# Patient Record
Sex: MALE
[De-identification: names, ages, dates, MRNs, and addresses within clinical notes are randomized per-mention and may not be internally consistent; named-entity substitution may affect disease eponyms.]

---

## 2022-09-26 ENCOUNTER — APPOINTMENT (OUTPATIENT)
Dept: UROLOGY | Facility: CLINIC | Age: 42
End: 2022-09-26

## 2022-09-26 VITALS
DIASTOLIC BLOOD PRESSURE: 78 MMHG | HEIGHT: 70.87 IN | WEIGHT: 165.35 LBS | TEMPERATURE: 97.2 F | BODY MASS INDEX: 23.15 KG/M2 | SYSTOLIC BLOOD PRESSURE: 128 MMHG

## 2022-09-26 DIAGNOSIS — Z78.9 OTHER SPECIFIED HEALTH STATUS: ICD-10-CM

## 2022-09-26 PROCEDURE — 99205 OFFICE O/P NEW HI 60 MIN: CPT | Mod: 25

## 2022-09-26 PROCEDURE — 93980 PENILE VASCULAR STUDY: CPT

## 2022-09-26 PROCEDURE — 54235 NJX CORPORA CAVERNOSA RX AGT: CPT

## 2022-09-26 NOTE — HISTORY OF PRESENT ILLNESS
[FreeTextEntry1] : This is a 42-year-old gentleman with a 7-year history of erectile dysfunction.  The patient has seen multiple physicians in [place].  The erectile dysfunction is causing him a lot of distress and marital problems.  His erection is insufficient to achieve intercourse with his wife.  He has tried oral medications penile injections and even went as far as to get platelet rich plasma injections in the penis.  He has had multiple duplex studies which were inconclusive.

## 2022-09-26 NOTE — ASSESSMENT
[FreeTextEntry1] : The patient scheduled this consultation to discuss the different treatment options available for his organic erectile dysfunction. The following note describes the conversation that was performed today during the consultation. \par \par  I reviewed the Patient History Form which the patient filled out, made sure that his ailment was organic erectile dysfunction and I discussed in detail with him all previously tried treatments for his ED. We had a thorough discussion about all of the alternatives available, and I made sure to include in our discussion pills such as Levitra/Viagra/Cialis, as well as penile self injectable therapies, MUSE (Medicated Urethral System for Erection), vacuum device, and penile implant.  I stressed the risks and benefits and pros and cons of each of these options extensively. A power point presentation was also used to illustrate each treatment option. The patient was also provided with a packet of written information as well as a list of patients to speak to on the phone.  \par \par In discussing penile implant surgery, the patient was made aware of the different types of penile implants- including semirigid devices, 2-piece or Ambicor (AMS) devices, and the inflatable penile prosthesis with 3 components. I went on to mention that there are 2 brands of devices, Coloplast and AMS, and that the AMS is impregnated with antibiotic (inhibizone), and the Coloplast is dipped then coated with an antibiotic. I also referred him to my website in order to obtain additional information about the types of implants available.  He felt he would defer to my judgment as to which device to use.\par \par I also described the highlights and benefits of the "No-Touch" surgical technique and outcome data including number of procedures previously performed and updated rate of infection. In this initial discussion of the penile implant option, I made sure we had a very long and broderick discussion about the risks.  I stated that, first and foremost, infection is the most dreaded risk and complication, which range in incidence from 1-3% of all cases performed in the [place], but that in my hands, using the "No-Touch" technique the incidence of infection is less than 1%.  I stated that should infection occur, the entire device would need to be removed, which typically happens in the first several weeks after surgery.  I explained that should this occur, there would likely be corporal fibrosis, scarring, penile shortening and even penile necrosis and disfigurement.  I said that while I would do absolutely everything possible to reduce and mitigate this risk, if it occur, the device would have to be removed, and then a salvage procedure with a semi-rigid implant possibly done, or the device would have to be removed with delayed re-implantation, or simply avoid future surgery completely.  I explained what this salvage procedure is, and that a new implant could be placed in the same setting with a complex irrigation of antibiotics and saline lavage, but that the infection risk at this salvage procedure is even higher, up to 30%. Furthermore the possible need for hospitalization, prolonged intravenous antibiotics and need further additional surgery was also discussed.  The patient was informed that if the salvage operation failed or if the infected implant were to be removed completely that significant shortening of the penis would occur making implantation of another device very difficult with very poor outcome and patient satisfaction. I explained that this is a real and significant risk that has to be weighed and considered.\par \par Next I expounded on the other risks of the operation.  These include injury to the urethra or bladder, and should these occur, the operation would have to be altered or aborted.  I explained that very rarely, vascular injury and bleeding can happen, and if iliac vein injury occurs from reservoir placement, this could be catastrophic and result in major blood loss and theoretically risk of leg loss in severe instances.  \par \par I went on to discuss that after the implant is placed, penile shortening could likely occur, and this is up to 1-2cm total.  Some of this is due to lack of glans engorgement, though MUSE could be used post-operatively to reduce this factor.  Next I also explained the risk of dissatisfaction with the cosmetic or functional result of the device, meaning that he could simply be unhappy with the result.  Some people find that while they have a good full erection, they have changes in sensation, difficulty obtaining an orgasm, and dissatisfaction with sex in general.  I made sure he verbalized and demonstrated a good understanding of these points.\par \par Next, I explained the risk of device breakage or failure, and future operations might be needed should this occur to fix the device tubing breakages with fluid leaks.  There is also a risk of auto-inflation, and even inability to successfully use the device due to technical considerations and inability to use or find the pump.  I did state that I would be available to him to teach him and train him to use his device, and also available to treat any other issue mentioned above such as device breakage or auto-inflation.\par \par Next we discussed the fact that rarely further minor surgery may be needed to make final adjustments to the penile implant. Reasons for this would be to adjust the length of the cylinders, reposition the pump or location of the reservoir. \par \par Prior to scheduling surgery the patient was asked to read the material which is provided to him today, to see a cardiologist to obtain a medical clearance, to visit our website [email or web address]   to obtain additional information, to call patients who were previously implanted and to discuss his options with his partner. Before leaving the consultation, I made sure he verbalized understanding all the risk and benefits, and pros and cons of surgery.  He had the ability to ask questions, and I also explained to him what to expect from the surgery.  I made sure he had access to literature to read, and offered him the ability to speak to prior patients of mine to get a sense of what to expect, and that these reports would hopefully be as unbiased as possible. If he remains interested in having an implant he understands that he will need to schedule a penile Duplex ultrasound study during which measurements of the penis will be made.\par \par \par PENILE INJECTION TEST:\par \par TC RAMIREZ \par 09/26/2022 \par \par The patient was placed supine on the procedure table.  The left side of the penis was prepped with alcohol, and the patient received 40 mcg @ 1 cc of Alprostadil.\par The patient tolerated the injection well.  No bleeding occurred at the injection site.   \par \par The patient was examined at 5, 10, 30 and 45 minutes interval post injection:  \par The patient’s penis was:   13   cm stretched \par Deformity: up\par Narrowing: \par An “hour glass deformity”: yes\par Curvature: upward\par a 3.4 by 4.37 mm scar is preset in the corpora\par Post Injection Erectile Response:\par At 5 minutes:   20   % rigid erection was noted. \par At 15 minutes: 50  % rigidity.  \par At 30 minutes:   60  % rigidity. \par Spontaneous detumescence occurred after 60 minutes.   \par The patient was discharged with a soft erection and was advised to call should his erection become more rigid.\par \par Post response evaluation by the patient:\par The patient described that this erection was better than his sexually induced erections.  \par The erection was not adequate for vaginal penetration.\par Impression:         Severe organic erectile dysfunction.\par \par Recommendation:   Insertion of inflatable implant\par \par   \par PENILE DUPLEX SONOGRAPHY WITH PULSED DOPPLER ANALYSIS:\par Procedure description:\par The flaccid penis was scanned with the 18 MHz probe and images obtained longitudinally.  \par The diameters of the corporal arteries were measured:\par \par The right cavernosal artery measured: .95 mm\par The left cavernosal artery measured:  1.08  mm\par \par Following intracavernous injection of alprostadil 40 microgram/ml in 1  ml\par The penis was rescanned and the diameter of the cavernosal arteries were measured again to assess distensibility in response to the vasoactive medication. \par (A 100% increase in diameter is normally expected.)\par \par The left cavernosal artery measured: 1.16  mm\par The right cavernosal artery measured: ,9  mm\par \par Pulsed Doppler analysis:\par Each of the selective imaged arteries underwent penile Doppler analysis with generation of waveform. \par The peak velocity data of the cavernosal arteries is tabulated below: \par (A velocity of 35 cm/s or more is normally expected.)  \par Resistive index parameters were obtained bilaterally (normal is 100%): \par \par Results: \par Left cavernosal artery peak systolic velocity at 15 minutes: 33.6  centimeters per second\par Let cavernosal end-diastolic velocity at 15 minutes:           12.9     centimeters per second\par Left cavernosal artery resistive index:    62   % \par \par Right cavernosal artery peak systolic velocity at 15 minutes:  36.5 centimeters per second \par Right end-diastolic velocity at 15 minutes:                    7.75             centimeters per second\par Right cavernosal artery resistive index:    .79  % \par \par Spontaneous detumescence occurred after 60  minutes\par The patient was discharged with a soft erection and was advised to call should an erection persist for more than 4 hours.  \par \par Impression:\par Arterial  distensibility:  nl \par Arterial peak flow velocities:  nl   \par Resistive index:  abnormal\par \par Based of the patient's medical history, pertinent physical findings and the above parameters, the patient's diagnosis is venoocclusive dysfunction\par \par After the Duplex study was terminated, I sat with the patient and his brother for 45 minutes and I explained to him the findings of the study. I also discussed his diagnosis with him as well as the recommended course of action. He understands the reason why he has ED and agrees with the plan of action.\par \par \par \par \par \par

## 2022-09-26 NOTE — PHYSICAL EXAM
[Edema] : no peripheral edema [Abdomen Soft] : soft [Abdomen Tenderness] : non-tender [Costovertebral Angle Tenderness] : no ~M costovertebral angle tenderness [Urethral Meatus] : meatus normal [Urinary Bladder Findings] : the bladder was normal on palpation [Scrotum] : the scrotum was normal [Testes Mass (___cm)] : there were no testicular masses [No Prostate Nodules] : no prostate nodules

## 2022-09-26 NOTE — REVIEW OF SYSTEMS
[Negative] : Heme/Lymph [FreeTextEntry1] : Tremors, lower  back pain with prolonged standing, excessive thirst at night

## 2022-10-04 ENCOUNTER — TRANSCRIPTION ENCOUNTER (OUTPATIENT)
Age: 42
End: 2022-10-04

## 2022-11-21 RX ORDER — TADALAFIL 5 MG/1
5 TABLET ORAL
Qty: 30 | Refills: 0 | Status: ACTIVE | COMMUNITY
Start: 1900-01-01 | End: 1900-01-01

## 2023-01-23 ENCOUNTER — APPOINTMENT (OUTPATIENT)
Dept: UROLOGY | Facility: CLINIC | Age: 43
End: 2023-01-23
Payer: COMMERCIAL

## 2023-01-23 VITALS
BODY MASS INDEX: 24.34 KG/M2 | TEMPERATURE: 98.4 F | SYSTOLIC BLOOD PRESSURE: 118 MMHG | WEIGHT: 170 LBS | DIASTOLIC BLOOD PRESSURE: 72 MMHG | HEIGHT: 70 IN

## 2023-01-23 DIAGNOSIS — N52.9 MALE ERECTILE DYSFUNCTION, UNSPECIFIED: ICD-10-CM

## 2023-01-23 DIAGNOSIS — Z01.818 ENCOUNTER FOR OTHER PREPROCEDURAL EXAMINATION: ICD-10-CM

## 2023-01-23 DIAGNOSIS — R35.0 FREQUENCY OF MICTURITION: ICD-10-CM

## 2023-01-23 PROCEDURE — 51798 US URINE CAPACITY MEASURE: CPT | Mod: 59

## 2023-01-23 PROCEDURE — 51741 ELECTRO-UROFLOWMETRY FIRST: CPT

## 2023-01-23 PROCEDURE — 99215 OFFICE O/P EST HI 40 MIN: CPT | Mod: 25

## 2023-01-23 RX ORDER — IBUPROFEN 600 MG/1
600 TABLET, FILM COATED ORAL 3 TIMES DAILY
Qty: 28 | Refills: 0 | Status: ACTIVE | COMMUNITY
Start: 2023-01-23 | End: 1900-01-01

## 2023-01-23 RX ORDER — CHLORHEXIDINE GLUCONATE 213 G/1000ML
4 SOLUTION TOPICAL
Qty: 1 | Refills: 0 | Status: ACTIVE | COMMUNITY
Start: 2023-01-23 | End: 1900-01-01

## 2023-01-23 RX ORDER — DOCUSATE SODIUM 100 MG/1
100 CAPSULE ORAL TWICE DAILY
Qty: 56 | Refills: 0 | Status: ACTIVE | COMMUNITY
Start: 2023-01-23 | End: 1900-01-01

## 2023-01-23 RX ORDER — MAGNESIUM HYDROXIDE 400 MG/5ML
7.75 SUSPENSION, ORAL (FINAL DOSE FORM) ORAL
Qty: 355 | Refills: 0 | Status: ACTIVE | COMMUNITY
Start: 2023-01-23 | End: 1900-01-01

## 2023-01-23 RX ORDER — CEPHALEXIN 250 MG/1
250 CAPSULE ORAL
Qty: 84 | Refills: 0 | Status: ACTIVE | COMMUNITY
Start: 2023-01-23 | End: 1900-01-01

## 2023-01-23 RX ORDER — ACETAMINOPHEN AND CODEINE 300; 30 MG/1; MG/1
300-30 TABLET ORAL EVERY 4 HOURS
Qty: 40 | Refills: 0 | Status: ACTIVE | COMMUNITY
Start: 2023-01-23 | End: 1900-01-01

## 2023-01-23 RX ORDER — OXYCODONE AND ACETAMINOPHEN 5; 325 MG/1; MG/1
5-325 TABLET ORAL EVERY 4 HOURS
Qty: 42 | Refills: 0 | Status: ACTIVE | COMMUNITY
Start: 2023-01-23 | End: 1900-01-01

## 2023-02-03 ENCOUNTER — APPOINTMENT (OUTPATIENT)
Dept: UROLOGY | Facility: AMBULATORY SURGERY CENTER | Age: 43
End: 2023-02-03

## 2023-04-25 PROBLEM — Z01.818 PREOPERATIVE EXAMINATION: Status: ACTIVE | Noted: 2023-01-20

## 2023-04-25 PROBLEM — N52.9 ERECTILE DYSFUNCTION OF ORGANIC ORIGIN: Status: ACTIVE | Noted: 2022-09-26

## 2023-04-25 PROBLEM — N52.9 ERECTILE DYSFUNCTION: Status: ACTIVE | Noted: 2022-09-26

## 2023-04-25 PROBLEM — R35.0 FREQUENCY OF URINATION: Status: ACTIVE | Noted: 2023-04-25

## 2023-04-25 NOTE — ASSESSMENT
[FreeTextEntry1] : In this pre-operative setting, I spent an additional 65 minutes to the procedures performed today, ensuring that the discussions we had in the office during the patient's initial consultation and penile Doppler visit was still clear in his mind, that he understood the risks and benefits and pros and cons of the penile implant procedure, including treatment alternatives, and that he verbalized the risks and wanted to proceed.  I again made sure he knew that the penile implant is a prosthesis that contains three basic elements consisting of: two parallel hollow cylinders that are placed within the penis, a pump and valve mechanism that is placed in the scrotum, and a spherical reservoir filled with saline that is placed in the lower abdomen.\par Although the penile prosthesis is placed through an incision in the scrotum, I explained that this is a surgical procedure that is relatively simple but can be more complicated in the presence of scar tissue in the pelvic area due to previous surgery and which will sometimes require an additional or alternative incision for placement of the reservoir, or potentially a narrow-based device if extensive fibrosis of the penis noted.  \par We reiterated that the penile implant is designed to simulate but not necessarily replace the natural erectile capability that he previously had.  Once implanted, there will be limited or no capability of natural erections occurring in the future and will limit the opportunity for other treatment options such as pills or injections, to successfully work in his body. When inflated, I explained and he understood that the implant will expand in girth, but not length and when deflated, the penis will become flaccid but potentially remain extended and will not retract due to the cylinders that are placed in the penile shaft.  The implant reservoir has a lock-out valve to limit the opportunity for fluid to flow into the cylinders and create an unexpected or unwanted erection, called "Auto Inflation".  Due to the lock-out valve, this rarely occurs, however if the reservoir is allowed to heal with the penile cylinders maintained partially inflated, he recognized that a partial erection will always be present and it is possible for auto-inflation to occur.\par We talked about the fact that the penile implant will not grow in length beyond the basic, "stretched penis" level and the measurement, which were obtained during the penile Duplex study.  This is the length he considers to be sufficient for sexual intercourse.  The glans, or tip, of the penis will not expand, as the tip of the cylinders is designed to stabilize and support the glans, but not inflate beyond the penile corona.  He was also counseled that although the results with an inflatable penile prosthesis are very good, the erection will never be as large as the previous normal erection.  The size of the flaccid penis may differ from that before the surgery.\par He understands that the risk of serious complications during surgery is very low.  There are a number of difficulties that can arise at the time of surgery, and are usually overcome.  Rarely the operation cannot be completed or an alternative to an inflatable penile prosthesis placed such as a semi-rigid implant.  The prosthesis is a mechanical device, which may suffer mechanical failure.  The current 10-year survival rate of the inflatable penile prosthesis is greater than 90%.  An operation can be performed to replace a device that has suffered mechanical failure.\par He has discussed the post-op care and expectations with Bekah and me.  He realizes that it is not uncommon for patients to experience swelling, bruising, pain, irritation, etc for the first four-to-six weeks after the procedure.  \par Lastly, I made sure again that he understood that, like any surgical procedure, complications may occur.  These complications include but are not limited to infection (1-4%) and erosion (1-11%) (Erosion is when the prosthesis erodes through to the outside of the body).  An infected prosthesis cannot be saved by antibiotics alone and must then be removed.  It is possible to insert prosthesis at the same operative session (salvage) or at a later date but this is more difficult and the infection rate is higher. If another prosthesis cannot be inserted at the same time as the infected one removed, significant and irreversible penile shortening will occur.\par             I asked him if he had any further questions and made sure that he understood all of all the pre-operative instructions which were reviewed with him by Bekah my . He was provided with written pre and postoperative instructions, prescriptions and my private cell number. I informed him that he may call for any questions or concerns at any time.\par \par \par BLADDER SCAN:\par Indication: Increased frequency of urination. \par Initial Volume:    cc\par PVR:  95 cc\par Results: Stress urinary incontinence. \par Recommendations: No Therapy Needed.\par \par URO FLOWMETRY:\par Indication: Increased frequency of urination. \par Urinary Flow Rate:    m/s\par Results: Stress urinary incontinence    \par Recommendations: No therapy needed.\par

## 2023-04-25 NOTE — PHYSICAL EXAM
[Abdomen Soft] : soft [Abdomen Tenderness] : non-tender [Costovertebral Angle Tenderness] : no ~M costovertebral angle tenderness [Urethral Meatus] : meatus normal [Urinary Bladder Findings] : the bladder was normal on palpation [Scrotum] : the scrotum was normal [Testes Mass (___cm)] : there were no testicular masses [No Prostate Nodules] : no prostate nodules [Edema] : no peripheral edema

## 2023-05-01 ENCOUNTER — APPOINTMENT (OUTPATIENT)
Dept: UROLOGY | Facility: CLINIC | Age: 43
End: 2023-05-01
Payer: COMMERCIAL

## 2023-05-01 VITALS
WEIGHT: 176 LBS | DIASTOLIC BLOOD PRESSURE: 76 MMHG | TEMPERATURE: 97.6 F | SYSTOLIC BLOOD PRESSURE: 112 MMHG | BODY MASS INDEX: 25.2 KG/M2 | HEIGHT: 70 IN

## 2023-05-01 DIAGNOSIS — N48.6 INDURATION PENIS PLASTICA: ICD-10-CM

## 2023-05-01 DIAGNOSIS — N52.02 CORPORO-VENOUS OCCLUSIVE ERECTILE DYSFUNCTION: ICD-10-CM

## 2023-05-01 DIAGNOSIS — Z00.00 ENCOUNTER FOR GENERAL ADULT MEDICAL EXAMINATION W/OUT ABNORMAL FINDINGS: ICD-10-CM

## 2023-05-01 DIAGNOSIS — N48.89 OTHER SPECIFIED DISORDERS OF PENIS: ICD-10-CM

## 2023-05-01 DIAGNOSIS — T83.490A OTHER MECHANICAL COMPLICATION OF IMPLANTED PENILE PROSTHESIS, INITIAL ENCOUNTER: ICD-10-CM

## 2023-05-01 PROCEDURE — 99214 OFFICE O/P EST MOD 30 MIN: CPT

## 2023-05-01 NOTE — HISTORY OF PRESENT ILLNESS
[FreeTextEntry1] : Patient is 8 weeks status post insertion of a Coloplast 22 cm touch implant.  He complains of pain of the glands on inflation.  He consulted with Dr. Carson who performed the implant and was told that it was oversized.

## 2023-05-01 NOTE — ASSESSMENT
[FreeTextEntry1] : I discussed with the patient that the tendon there is are slightly oversized with the tip of the left cylinder well be the glans.  I informed him that this may cause discomfort to his sexual partner.  I also informed him that over time this may cause pressure atrophy of the glans especially on the left and that this would require placement of the graft for repair.  Since he is only 8 weeks after surgery and has not used the implant yet I would recommend that he uses the implant to see whether natural engorgement of the glans will compensate for the oversizing.  I also recommended that he observe the area on the left for signs of pressure atrophy.  He will return to [facility] and will follow-up in 6 to 8 weeks